# Patient Record
Sex: FEMALE | Race: BLACK OR AFRICAN AMERICAN | Employment: OTHER | URBAN - METROPOLITAN AREA
[De-identification: names, ages, dates, MRNs, and addresses within clinical notes are randomized per-mention and may not be internally consistent; named-entity substitution may affect disease eponyms.]

---

## 2024-02-21 ENCOUNTER — HOSPITAL ENCOUNTER (EMERGENCY)
Age: 49
Discharge: HOME | End: 2024-02-22
Attending: STUDENT IN AN ORGANIZED HEALTH CARE EDUCATION/TRAINING PROGRAM
Payer: COMMERCIAL

## 2024-02-21 DIAGNOSIS — T85.598A OBSTRUCTION OF FEEDING TUBE, INITIAL ENCOUNTER: Primary | ICD-10-CM

## 2024-02-21 PROCEDURE — 99283 EMERGENCY DEPT VISIT LOW MDM: CPT

## 2024-02-21 ASSESSMENT — PAIN DESCRIPTION - LOCATION: LOCATION: ABDOMEN;BACK

## 2024-02-21 ASSESSMENT — PAIN - FUNCTIONAL ASSESSMENT: PAIN_FUNCTIONAL_ASSESSMENT: 0-10

## 2024-02-21 ASSESSMENT — PAIN SCALES - GENERAL: PAINLEVEL_OUTOF10: 9

## 2024-02-22 ENCOUNTER — APPOINTMENT (OUTPATIENT)
Dept: GENERAL RADIOLOGY | Age: 49
End: 2024-02-22
Payer: COMMERCIAL

## 2024-02-22 VITALS
OXYGEN SATURATION: 99 % | TEMPERATURE: 98.9 F | WEIGHT: 97 LBS | HEIGHT: 62 IN | DIASTOLIC BLOOD PRESSURE: 71 MMHG | RESPIRATION RATE: 16 BRPM | HEART RATE: 94 BPM | BODY MASS INDEX: 17.85 KG/M2 | SYSTOLIC BLOOD PRESSURE: 93 MMHG

## 2024-02-22 PROCEDURE — 74018 RADEX ABDOMEN 1 VIEW: CPT

## 2024-02-22 NOTE — DISCHARGE INSTRUCTIONS
Please follow-up with your surgeon next week as currently scheduled.  Return to the hospital if you develop any pain or have any difficulty or have any acute concerns.

## 2024-02-22 NOTE — ED PROVIDER NOTES
THE [facility]  EMERGENCY DEPARTMENT ENCOUNTER          EM RESIDENT NOTE       Date of evaluation: 2/21/2024    Chief Complaint     Other (States feeding tube is clogged would not flush )      History of Present Illness     Ivory Hill is a 48 y.o. female who presents with a past medical history of gastroparesis (with postpyloric nasogastric feeding tube), GERD, SLE, Raynaud's syndrome, scleroderma, SMA syndrome who presents with an issue with her nasogastric feeding tube.    Patient reports that since last night she has been unable to flush her feeding tube.  She denies having any issues with the tube prior to tonight, and did not believe that it was displaced.  She reports that she has pain located in the center of her upper abdomen as well as the lower back pain that is consistent with her chronic pain in these areas, and denies any acute changes in her symptoms.  She denies associated vomiting, diarrhea, fevers, chills, recent illnesses.  She reports that she has presented in the past for similar issues, and that the tube was able to be unclogged by re-threading a wire.    MEDICAL DECISION MAKING / ASSESSMENT / PLAN     INITIAL VITALS: BP: 98/81, Temp: 98.9 °F (37.2 °C), Pulse: (!) 108, Respirations: 18, SpO2: 98 %    Ivory Hill is a 48 y.o. female with a past medical history as described in the HPI who presents with an issue with her nasogastric feeding tube    Is this patient to be included in the SEP-1 core measure? No Exclusion criteria - the patient is NOT to be included for SEP-1 Core Measure due to: Infection is not suspected    Medical Decision Making  Amount and/or Complexity of Data Reviewed  Radiology: ordered.        This patient was also evaluated by the attending physician. All care plans werediscussed and agreed upon.    Clinical Impression     1. Obstruction of feeding tube, initial encounter        Disposition     PATIENT REFERRED TO:  No follow-up provider

## 2024-02-22 NOTE — CONSULTS
General Surgery   Resident Consult Note    Reason for Consult: Clogged NJ tube     History of Present Illness:   Ivory Hill is a 48 y.o. female with Hx of NJ tube placement due to gastroparesis who presents with clogged NJ.  Patient uses NJ tube for all feeds.  She states that it was unable to be flushed earlier today.  She presented to the ED for declogging.  ED was unable to declog the tube with clog zapper and Corpak wire and surgery was consulted for assistance.    Patient denies any chest pain, shortness of breath, or abdominal pain.  She would like her tube fixed.  This has happened before.  She has had the tube for several months.  She follows with GI and surgery at [facility].    Past Medical History:        Diagnosis Date    Gastroparesis     GERD (gastroesophageal reflux disease)     Lupus (HCC)     Raynaud disease        Past Surgical History:    History reviewed. No pertinent surgical history.    Allergies:  Cephalexin and Sulfa antibiotics    Medications:   Home Meds  No current facility-administered medications on file prior to encounter.     No current outpatient medications on file prior to encounter.       Current Meds  No current facility-administered medications for this encounter.      Family History:   History reviewed. No pertinent family history.    Social History:   TOBACCO:   has no history on file for tobacco use.  ETOH:   has no history on file for alcohol use.  DRUGS:   has no history on file for drug use.    Review of Systems:   A 14 point review of systems was conducted, significant findings as noted in HPI. All other systems negative.     Physical exam:    Vitals:    02/21/24 2001 02/21/24 2002 02/21/24 2007 02/22/24 0424   BP: 98/81   93/71   Pulse:   (!) 108 94   Resp: 18   16   Temp:  98.9 °F (37.2 °C)     TempSrc:  Oral     SpO2:   98% 99%   Weight:       Height:           General appearance: alert, no acute distress  HEENT: NJ tube bridled to right nare.  Approximately 6 cm

## 2024-02-22 NOTE — ED NOTES
Patient prepared for and ready to be discharged. Dressed in clothes and given belongings.  Patient discharged at this time in no acute distress after pt verbalized understanding of discharge instructions.   Reviewed medications, and when to return to the ED with patient. Encouraged follow up with PCP  Patient walked to lobby, Family to take patient home.      Escuadra, Marylee, RN  02/22/24 0425

## 2024-02-22 NOTE — ED TRIAGE NOTES
[facility] Emergency Department  MEDICAL SCREENING EXAM    Date of Service: 2/21/2024    Reason for Visit: Other (States feeding tube is clogged would not flush )        Patient History, Brief Exam, and Initial Assessment     Abbreviated HPI: Ivory Hill is a 48 y.o. female presenting with clogged nasogastric feeding tube.  Has a history of lupus, Raynaud's, gastroparesis and states this tube has been present since December.  Did require declogging once previously with a wire.  No other complaints today.  No discomfort from the tube.    INITIAL VITALS: BP: 98/81, Temp: 98.9 °F (37.2 °C), Pulse: (!) 108, Respirations: 18, SpO2: 98 %    Alert, no apparent distress, respirations even and unlabored.  Smallbore feeding tube in right nare with scant amount of tube feed material noted at the distal lumen.    Plan     Patient was evaluated in the REU for a medical screening exam.  To further evaluate the presenting complaints, the following orders have been placed: N/A.  See primary provider's note for full details and final disposition.     Current Facility-Administered Medications:   No orders of the defined types were placed in this encounter.        REU Dispo     Stable for lobby while awaiting ED bed      Relevant Medical History     Past Medical History:   Diagnosis Date    Gastroparesis     GERD (gastroesophageal reflux disease)     Lupus (HCC)     Raynaud disease      History reviewed. No pertinent surgical history.  Allergies   Allergen Reactions    Cephalexin Hives, Other (See Comments) and Rash     Bruising. Keflex and bactrim taken together. Unsure which caused the reaction.    bruising    Sulfa Antibiotics          Sky Grady MD  8:23 PM        Sky Grady MD  02/21/24 2024

## 2024-02-22 NOTE — ED PROVIDER NOTES
ED Attending Attestation Note     Date of evaluation: 2/21/2024    This patient was seen by the resident.  I have seen and examined the patient, agree with the workup, evaluation, management and diagnosis. The care plan has been discussed.  My assessment reveals 48-year-old female with a history of NG tube for gastroparesis presenting to the hospital for evaluation of a clogged feeding tube.  Patient noted that it would not flush.  She otherwise denies any acute symptoms.  She states that this has happened previously in the past.  She on assessment is noted to be well-appearing and otherwise has no acute complaints..    We had difficulty with getting the tube to flush and surgery was consulted and supposedly they were able to remove the obstruction and patient felt significantly better.  Patient and family wanted to leave and I had not had any formal discussion with surgery regarding what they did and if there were any follow-up instructions but given the patient and family wanted to leave and she was feeling better she was discharged.      Kareem Reich MD  02/22/24 0422

## 2024-08-28 ENCOUNTER — ANESTHESIA EVENT (OUTPATIENT)
Dept: ENDOSCOPY | Age: 49
End: 2024-08-28
Payer: COMMERCIAL

## 2024-08-29 ENCOUNTER — HOSPITAL ENCOUNTER (OUTPATIENT)
Age: 49
Setting detail: OUTPATIENT SURGERY
Discharge: HOME | End: 2024-08-29
Attending: INTERNAL MEDICINE | Admitting: INTERNAL MEDICINE
Payer: COMMERCIAL

## 2024-08-29 ENCOUNTER — ANESTHESIA (OUTPATIENT)
Dept: ENDOSCOPY | Age: 49
End: 2024-08-29
Payer: COMMERCIAL

## 2024-08-29 VITALS
OXYGEN SATURATION: 100 % | WEIGHT: 88 LBS | HEART RATE: 80 BPM | DIASTOLIC BLOOD PRESSURE: 68 MMHG | BODY MASS INDEX: 16.2 KG/M2 | TEMPERATURE: 97.3 F | SYSTOLIC BLOOD PRESSURE: 91 MMHG | RESPIRATION RATE: 16 BRPM | HEIGHT: 62 IN

## 2024-08-29 PROCEDURE — 3700000000 HC ANESTHESIA ATTENDED CARE: Performed by: INTERNAL MEDICINE

## 2024-08-29 PROCEDURE — 6360000002 HC RX W HCPCS: Performed by: NURSE ANESTHETIST, CERTIFIED REGISTERED

## 2024-08-29 PROCEDURE — 2580000003 HC RX 258: Performed by: ANESTHESIOLOGY

## 2024-08-29 PROCEDURE — 3700000001 HC ADD 15 MINUTES (ANESTHESIA): Performed by: INTERNAL MEDICINE

## 2024-08-29 PROCEDURE — 7100000011 HC PHASE II RECOVERY - ADDTL 15 MIN: Performed by: INTERNAL MEDICINE

## 2024-08-29 PROCEDURE — 7100000010 HC PHASE II RECOVERY - FIRST 15 MIN: Performed by: INTERNAL MEDICINE

## 2024-08-29 PROCEDURE — 3609017700 HC EGD DILATION GASTRIC/DUODENAL STRICTURE: Performed by: INTERNAL MEDICINE

## 2024-08-29 PROCEDURE — 2500000003 HC RX 250 WO HCPCS: Performed by: NURSE ANESTHETIST, CERTIFIED REGISTERED

## 2024-08-29 RX ORDER — PROPOFOL 10 MG/ML
INJECTION, EMULSION INTRAVENOUS PRN
Status: DISCONTINUED | OUTPATIENT
Start: 2024-08-29 | End: 2024-08-29 | Stop reason: SDUPTHER

## 2024-08-29 RX ORDER — URSODIOL 300 MG/1
300 CAPSULE ORAL 2 TIMES DAILY
COMMUNITY
Start: 2024-07-08

## 2024-08-29 RX ORDER — LIDOCAINE HYDROCHLORIDE 20 MG/ML
INJECTION, SOLUTION INFILTRATION; PERINEURAL PRN
Status: DISCONTINUED | OUTPATIENT
Start: 2024-08-29 | End: 2024-08-29 | Stop reason: SDUPTHER

## 2024-08-29 RX ORDER — MYCOPHENOLATE MOFETIL 500 MG/1
500 TABLET ORAL 2 TIMES DAILY
COMMUNITY

## 2024-08-29 RX ORDER — M-VIT,TX,IRON,MINS/CALC/FOLIC 27MG-0.4MG
1 TABLET ORAL DAILY
COMMUNITY

## 2024-08-29 RX ORDER — SODIUM CHLORIDE, SODIUM LACTATE, POTASSIUM CHLORIDE, CALCIUM CHLORIDE 600; 310; 30; 20 MG/100ML; MG/100ML; MG/100ML; MG/100ML
INJECTION, SOLUTION INTRAVENOUS CONTINUOUS
Status: DISCONTINUED | OUTPATIENT
Start: 2024-08-29 | End: 2024-08-29 | Stop reason: HOSPADM

## 2024-08-29 RX ORDER — PANTOPRAZOLE SODIUM 40 MG/1
40 TABLET, DELAYED RELEASE ORAL 2 TIMES DAILY
COMMUNITY
Start: 2018-10-02

## 2024-08-29 RX ADMIN — PROPOFOL 80 MG: 10 INJECTION, EMULSION INTRAVENOUS at 14:28

## 2024-08-29 RX ADMIN — PROPOFOL 40 MG: 10 INJECTION, EMULSION INTRAVENOUS at 14:43

## 2024-08-29 RX ADMIN — SODIUM CHLORIDE, POTASSIUM CHLORIDE, SODIUM LACTATE AND CALCIUM CHLORIDE: 600; 310; 30; 20 INJECTION, SOLUTION INTRAVENOUS at 14:07

## 2024-08-29 RX ADMIN — PROPOFOL 20 MG: 10 INJECTION, EMULSION INTRAVENOUS at 14:35

## 2024-08-29 RX ADMIN — LIDOCAINE HYDROCHLORIDE 50 MG: 20 INJECTION, SOLUTION INFILTRATION; PERINEURAL at 14:28

## 2024-08-29 ASSESSMENT — PAIN - FUNCTIONAL ASSESSMENT
PAIN_FUNCTIONAL_ASSESSMENT: 0-10
PAIN_FUNCTIONAL_ASSESSMENT: 0-10
PAIN_FUNCTIONAL_ASSESSMENT: ACTIVITIES ARE NOT PREVENTED
PAIN_FUNCTIONAL_ASSESSMENT: 0-10

## 2024-08-29 ASSESSMENT — PAIN DESCRIPTION - DESCRIPTORS
DESCRIPTORS: SORE

## 2024-08-29 NOTE — ANESTHESIA POSTPROCEDURE EVALUATION
Department of Anesthesiology  Postprocedure Note    Patient: Ivory Hill  MRN: [id number]  YOB: 1975  Date of evaluation: 8/29/2024    Procedure Summary       Date: 08/29/24 Room / Location: [facility] ENDO  / [facility]    Anesthesia Start: 1423 Anesthesia Stop: 1450    Procedure: ESOPHAGOGASTRODUODENOSCOPY DILATATION Diagnosis:       Scleroderma (HCC)      Gastroesophageal reflux disease without esophagitis      Malnutrition, unspecified type (HCC)      Other dysphagia      Dilation of esophagus      (Scleroderma (HCC) [M34.9])      (Gastroesophageal reflux disease without esophagitis [K21.9])      (Malnutrition, unspecified type (HCC) [E46])      (Other dysphagia [R13.19])      (Dilation of esophagus [K22.89])    Surgeons: Frankie Madsen MD Responsible Provider: Michael Hunter MD    Anesthesia Type: MAC ASA Status: 3            Anesthesia Type: No value filed.    Jeanette Phase I: Jeanette Score: 10    Jeanette Phase II: Jeanette Score: 10    Anesthesia Post Evaluation    Patient location during evaluation: PACU  Patient participation: complete - patient participated  Level of consciousness: awake  Pain score: 0  Nausea & Vomiting: no nausea and no vomiting  Cardiovascular status: blood pressure returned to baseline  Respiratory status: acceptable  Hydration status: euvolemic  Pain management: adequate    No notable events documented.

## 2024-08-29 NOTE — H&P
Gastroenterology Outpatient History and Physical     Patient: Ivory Hill MRN: [id number] Sex: female   YOB: 1975 Age: 49 y.o. 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 GB still in. we follow regarding weight loss, failure to thrive, gastroporesis, s/p subtotal gastrectomy with Kristopher-en-Y reconstruction for severe gastroparesis 2/24.  Her postoperative course after this Kristopher-en-Y was initially fairly routine and she began to tolerate p.o. intake..  She has continued to tolerate p.o. intake better than before surgery and can eat once a day or so.  However, her weight has begun to drop again and she is back to about 85 pounds.  She has started TPN but is on a TPN holiday.  The main reason for EGD today is the CT scan earlier this month, which suggest an air-fluid level in the esophagus, and they cannot rule out a lesion in the distal esophagus.  She has some globus sensation and would like an empiric dilation while you are there.    8/24 CT angio for PE with no evidence of PE, bilateral lower lobe bronchiectasis, dilated esophagus with air-fluid level...cannot rule out lesion in distal esophagus.    8/24 echocardiogram with normal LV function, normal RV size and function, mild TR.  5/24 upper GI series with no evidence of obstruction.  Spontaneous reflux observed.  No evidence of leak in the context of Kristopher-en-Y.  3/24 CTAP with right lower quadrant abdominal wall abscess measuring 4 cm problem    Review of Systems:  Weight Loss: No  Dysphagia: No  Dyspepsia:  per year     Marital Status:    Intimate Partner Violence: Not At Risk (5/24/2024)    Received from [facility] [employer] [facility]     Interpersonal Safety     Do you feel physically or emotionally unsafe where you currently live?: No     Within the past 12 months, have you been hit, slapped, kicked or otherwise physically hurt by anyone? : No     Within the past 12 months, have you been hit, slapped, kicked or otherwise physically hurt by anyone? : No   Housing Stability: Not At Risk (5/24/2024)    [facility] from [facility]     Housing/Utilities     Are you worried about losing your housing? : No     Within the past 12 months, have you ever stayed: outside, in a car, in a tent, in an overnight shelter, or temporarily in someone else's home (i.e. couch-surfing)?: No     Within the past 12 months, have you been unable to get utilities (heat, electricity) when it was really needed?: No      No family history on file.  Allergies:   Allergies   Allergen Reactions    Cephalexin Rash     Bruising. Keflex and bactrim taken together. Unsure which caused the reaction.    bruising    Sulfamethoxazole-Trimethoprim Rash           Metronidazole Rash and Other (See Comments)    Sulfa Antibiotics      Medications:   Prior to Admission medications    Medication Sig Start Date End Date Taking? Authorizing Provider   pantoprazole (PROTONIX) 40 MG tablet Take 1 tablet by mouth in the morning and at bedtime 10/2/18  Yes Benji Harrison MD   ursodiol (ACTIGALL) 300 MG capsule Take 1 capsule by mouth 2 times daily 7/8/24  Yes Benji Harrison MD   Multiple Vitamins-Minerals (THERAPEUTIC MULTIVITAMIN-MINERALS) tablet Take 1 tablet by mouth daily   Yes Benji Harrison MD   mycophenolate (CELLCEPT) 500 MG tablet Take 1 tablet by mouth 2 times daily   Yes Benji Harrison MD       Vital Signs: /76   Pulse 83   Temp 96.8 °F (36 °C) (Temporal)   Resp 13   Ht 1.575 m (5' 2\")   Wt 39.9 kg (88 lb)   SpO2

## 2024-08-29 NOTE — DISCHARGE INSTRUCTIONS
ENDOSCOPY DISCHARGE INSTRUCTIONS:    Call the physician that did your procedure for any questions or concern:    [facility]: [phone]  DR. ZEYAD VARELA      ACTIVITY:    There are potential side effects to the medications used for sedation and anesthesia during your procedure.  These include:  Dizziness or light-headedness, confusion or memory loss, delayed reaction times, loss of coordination, nausea and vomiting.  Because of your increased risk for injury, we ask that you observe the following precautions:  For the next 24 hours,  DO NOT operate an automobile, bicycle, motorcycle, , power tools or large equipment of any kind.  Do not drink alcohol, sign any legal documents or make any legal decisions for 24 hours.  Do not bend your head over lower than your heart.  DO sit on the side of bed/couch awhile before getting up.  Plan on bedrest or quiet relaxation today.  You may resume normal activities in 24 hours.    DIET:    Your first meal today should be light, avoiding spicy and fatty foods.  If you tolerate this first meal, then you may advance to your regular diet unless otherwise advised by your physician.    NORMAL SYMPTOMS:  -Mild sore throat if you’ve had an EGD   -Gaseous discomfort    NOTIFY YOUR PHYSICIAN IF THESE SYMPTOMS OCCUR:  1. Fever (greater than 100)  5. Increased abdominal bloating  2. Severe pain    6. Excessive bleeding  3. Nausea and vomiting  7. Chest pain                                                                    4. Chills    8. Shortness of breath    ADDITIONAL INSTRUCTIONS:    Biopsy results: Call [facility] for biopsy results in 1 week    Educational Information:    POSTOPERATIVE DIAGNOSIS:   -Esophagus is mildly dilated, but is otherwise unremarkable.  Empiric Sahu dilation performed 54-56 Kiswahili, with some minor oozing in the hypopharynx, but no abrasion in the esophagus on second look esophagoscopy.  -Stomach contains small gastric pouch with 2 cm  chew and swallow. Good choices are mashed potatoes, soft breads and rolls, cream soups, oatmeal, and Cream of Wheat.  Choose soft, well-cooked vegetables and soft or canned fruits. Good choices are applesauce, ripe bananas, and non-citrus fruit juice.  Try milk, yogurt, or other milk products, if you can digest dairy without too many problems. Your doctor may limit milk and milk products for a while. If so, he or she may recommend a calcium and vitamin D supplement.  Choose soft protein foods such as eggs, tofu, steamed fish, chicken, and turkey. Slow-cooking methods, such as stewing, will help soften meat. Chopping meat in a  or  also will make it easier to eat.  Avoid nuts, raw vegetables, hard crackers, tough meats, and prunes and prune juice.  Avoid foods that are very spicy, such as foods seasoned with black pepper, chili peppers, horseradish, or hot sauce.  Avoid highly acidic foods such as citrus fruits, citrus fruit juices, and tomato-based foods.  Avoid high-fat foods such as fried meat, chips, and rich desserts.  Check with your doctor before you drink alcohol or beverages that have caffeine, such as coffee, tea, and cola beverages.

## 2024-08-29 NOTE — PROGRESS NOTES
Ambulatory Surgery/Procedure Discharge Note    Vitals:    08/29/24 1520   BP: 91/68   Pulse: 80   Resp: 16   Temp:    SpO2: 100%       In: 400 [I.V.:400]  Out: -     Restroom use offered before discharge.  Yes    Pain assessment:  level of pain (1-10, 10 severe)  Pain Level: 5, pt states pain tolerable for discharge   Pt to Endoscopy recovery post EGD with Dilation. Pt c/o throat  pain 7/10 upon arrival to unit, pt offered ice chips, pt declined. Pt pain reported to Dr. Madsen with new orders, administer GI cocktail. Pt given warm water, per pt \" no problems swallowing\"  After PO fluids pt states pain is now 5/10, pt new pain level reported to Dr. Madsen. Pt declined GI cocktail.Pt's IV fluids discontinued, PICC line flushed well with normal saline. Pt denies nausea at this time.   Discharge instructions given to pt and pt's daughter and both state understanding of these instructions. Pt and pt's daughter state that pt is \"ready to go.\" Pt notified that Dr. Madsen has called in prescription for throat pain to pt's pharmacy.         Patient discharged to home/self care. Patient discharged via wheel chair by transporter to waiting family/S.O.       8/29/2024 3:50 PM

## 2024-08-29 NOTE — PROCEDURES
4 cm problem  ENDOSCOPIST: ZEYAD VARELA MD    POSTOPERATIVE DIAGNOSIS:   -Esophagus is mildly dilated, but is otherwise unremarkable.  Empiric Sahu dilation performed 54-56 Iraqi, with some minor oozing in the hypopharynx, but no abrasion in the esophagus on second look esophagoscopy.  -Stomach contains small gastric pouch with 2 cm sliding-type hiatal hernia, and healthy appearing gastrojejunal anastomosis.  -Jejunum was healthy appearing and explored down to the 'Y' enteroenteric anastomosis    In summary, the CT scan findings are just noting the tendency to full liquids in the esophagus, which is essentially on the basis of her scleroderma esophagus but there are no concerning findings in the esophagus.  We did empirically dilate and perhaps she will notice some improvement in the globus sensation.    PLAN:    Continue Protonix daily.  GI soft diet then advance as tolerated.    INFORMED CONSENT:  Informed consent for esophagogastoduodenoscopy was obtained.  The benefits and risks including adverse medicine reaction have been explained.  The patient's questions were answered and the patient agreed to proceed.    ASA:  ASA 3 - Patient with moderate systemic disease with functional limitations    SEDATION: MAC     The patient's vital signs, cardiac status, pulmonary status, abdominal status and mental status were stable for the procedure.  The patient's vitals signs and respiratory function as monitored by oxygen saturation were stable throughout    Procedure Details:    The Olympus videoendoscope was inserted into the mouth and carefully passed into the esophagus, through the stomach and to the distal duodenum.  Antegrade and retrograde examination of the upper gi tract was carefully performed.    Findings:   The tubular esophagus was relatively hypotonic and mildly dilated, but there were no lesions in the esophagus, no strictures and no ulcers.  The GE junction and Z-line were at about the same level.  The  diaphragm was about a centimeter to distal, and there was a small gastric pouch with healthy appearing gastrojejunal anastomosis.  The afferent limb was short and blind.  The efferent limb was explored down about 35 cm to the enteroenteric anastomosis, which was also healthy appearing.  The gastroscope was withdrawn slowly and the mucosa examined on withdrawal with no additional findings.  Next, we did empirically pass a 54 Surinamese Sahu and then subsequently 56 Surinamese Sahu.  On second esophagoscopy, there was some minor oozing in the hypopharynx suggesting some minor abrasion from passage of the Sahu but no significant active bleeding.  There was no abrasion noted in the esophagus proper.    Gastric or Duodenal ulcer present: No    Estimated Blood Loss: Minimal      Signed By: ZEYAD VARELA MD